# Patient Record
Sex: FEMALE | Race: WHITE | HISPANIC OR LATINO | Employment: UNEMPLOYED | URBAN - METROPOLITAN AREA
[De-identification: names, ages, dates, MRNs, and addresses within clinical notes are randomized per-mention and may not be internally consistent; named-entity substitution may affect disease eponyms.]

---

## 2017-09-11 ENCOUNTER — HOSPITAL ENCOUNTER (EMERGENCY)
Facility: MEDICAL CENTER | Age: 22
End: 2017-09-11
Attending: EMERGENCY MEDICINE
Payer: MEDICAID

## 2017-09-11 VITALS
WEIGHT: 117.06 LBS | BODY MASS INDEX: 23.6 KG/M2 | DIASTOLIC BLOOD PRESSURE: 62 MMHG | TEMPERATURE: 97.7 F | HEART RATE: 89 BPM | OXYGEN SATURATION: 92 % | HEIGHT: 59 IN | RESPIRATION RATE: 18 BRPM | SYSTOLIC BLOOD PRESSURE: 101 MMHG

## 2017-09-11 DIAGNOSIS — J02.9 PHARYNGITIS, UNSPECIFIED ETIOLOGY: ICD-10-CM

## 2017-09-11 LAB
DEPRECATED S PYO AG THROAT QL EIA: NORMAL
SIGNIFICANT IND 70042: NORMAL
SITE SITE: NORMAL
SOURCE SOURCE: NORMAL

## 2017-09-11 PROCEDURE — 87081 CULTURE SCREEN ONLY: CPT | Mod: EDC

## 2017-09-11 PROCEDURE — 700111 HCHG RX REV CODE 636 W/ 250 OVERRIDE (IP): Mod: EDC | Performed by: EMERGENCY MEDICINE

## 2017-09-11 PROCEDURE — 99284 EMERGENCY DEPT VISIT MOD MDM: CPT | Mod: EDC

## 2017-09-11 PROCEDURE — 87880 STREP A ASSAY W/OPTIC: CPT | Mod: EDC

## 2017-09-11 RX ORDER — DEXAMETHASONE SODIUM PHOSPHATE 4 MG/ML
10 INJECTION, SOLUTION INTRA-ARTICULAR; INTRALESIONAL; INTRAMUSCULAR; INTRAVENOUS; SOFT TISSUE ONCE
Status: COMPLETED | OUTPATIENT
Start: 2017-09-11 | End: 2017-09-11

## 2017-09-11 RX ADMIN — PENICILLIN G BENZATHINE 1.2 MILLION UNITS: 1200000 INJECTION, SUSPENSION INTRAMUSCULAR at 02:50

## 2017-09-11 RX ADMIN — DEXAMETHASONE SODIUM PHOSPHATE 10 MG: 4 INJECTION, SOLUTION INTRAMUSCULAR; INTRAVENOUS at 02:49

## 2017-09-11 ASSESSMENT — LIFESTYLE VARIABLES
CONSUMPTION TOTAL: INCOMPLETE
TOTAL SCORE: 0
EVER FELT BAD OR GUILTY ABOUT YOUR DRINKING: NO
HAVE YOU EVER FELT YOU SHOULD CUT DOWN ON YOUR DRINKING: NO
HAVE PEOPLE ANNOYED YOU BY CRITICIZING YOUR DRINKING: NO
TOTAL SCORE: 0
EVER HAD A DRINK FIRST THING IN THE MORNING TO STEADY YOUR NERVES TO GET RID OF A HANGOVER: NO
TOTAL SCORE: 0
DO YOU DRINK ALCOHOL: YES

## 2017-09-11 NOTE — ED NOTES
Discharge instructions given to patient, a verbal understanding of all instructions was stated.Pt preferred to walk out. VSS, all belongings accounted for.

## 2017-09-11 NOTE — ED NOTES
Triage notes     Pt woke up feeling sick today and it has gotten worse.  Pt c/o swollen tonsils and congestion.  Pts tonsils are very swollen but are pink no white pockets seen initially.  Pt states that she is having trouble swallowing and breathing but able to speak in full sentences without trouble.    .  Chief Complaint   Patient presents with   • Sore Throat     started this morning, tonsils are visually large in back of throat pt states that she is having trouble breathing and swallowing    • Congestion     started today

## 2017-09-11 NOTE — DISCHARGE INSTRUCTIONS
Pharyngitis  Pharyngitis is a sore throat (pharynx). There is redness, pain, and swelling of your throat.  HOME CARE   · Drink enough fluids to keep your pee (urine) clear or pale yellow.  · Only take medicine as told by your doctor.  ¨ You may get sick again if you do not take medicine as told. Finish your medicines, even if you start to feel better.  ¨ Do not take aspirin.  · Rest.  · Rinse your mouth (gargle) with salt water (½ tsp of salt per 1 qt of water) every 1-2 hours. This will help the pain.  · If you are not at risk for choking, you can suck on hard candy or sore throat lozenges.  GET HELP IF:  · You have large, tender lumps on your neck.  · You have a rash.  · You cough up green, yellow-brown, or bloody spit.  GET HELP RIGHT AWAY IF:   · You have a stiff neck.  · You drool or cannot swallow liquids.  · You throw up (vomit) or are not able to keep medicine or liquids down.  · You have very bad pain that does not go away with medicine.  · You have problems breathing (not from a stuffy nose).  MAKE SURE YOU:   · Understand these instructions.  · Will watch your condition.  · Will get help right away if you are not doing well or get worse.     This information is not intended to replace advice given to you by your health care provider. Make sure you discuss any questions you have with your health care provider.     Document Released: 06/05/2009 Document Revised: 10/08/2014 Document Reviewed: 08/25/2014  [employer] Interactive Patient Education ©2016 [employer]

## 2017-09-11 NOTE — ED PROVIDER NOTES
"ED Provider Note    CHIEF COMPLAINT  Chief Complaint   Patient presents with   • Sore Throat     started this morning, tonsils are visually large in back of throat pt states that she is having trouble breathing and swallowing    • Congestion     started today        HPI  Tiara Olmedo is a 21 y.o. female Here for evaluation of sore throat and congestion. The patient states that this is an ongoing issue for her, and that every season when the weather changes, she gets very sick. She denies any fever or vomiting at this time. She states that it hurts to swallow, but she is able to. She has no drooling. She denies any ill contacts. She complains of overall congestion and ear pain.    PAST MEDICAL HISTORY   pharyngitis    SOCIAL HISTORY  Social History     Social History Main Topics   • Smoking status: Never Smoker   • Smokeless tobacco: Never Used   • Alcohol use Yes      Comment: occ   • Drug use: No   • Sexual activity: Yes     Partners: Male      Comment: none       SURGICAL HISTORY  patient denies any surgical history    CURRENT MEDICATIONS  Home Medications     Reviewed by Marjan Storm R.N. (Registered Nurse) on 09/11/17 at 0146  Med List Status: Partial   Medication Last Dose Status   amoxicillin (AMOXIL) 500 MG Cap  Active                ALLERGIES  No Known Allergies    REVIEW OF SYSTEMS  See HPI for further details. Review of systems as above, otherwise all other systems are negative.     PHYSICAL EXAM  VITAL SIGNS: /62   Pulse 73   Temp 36.5 °C (97.7 °F) (Temporal)   Resp 18   Ht 1.499 m (4' 11\")   Wt 53.1 kg (117 lb 1 oz)   LMP 06/11/2014   SpO2 98%   BMI 23.64 kg/m²     Constitutional: No distress. Well nourished.  HENT: Head is atraumatic. Oropharynx is moist. Tonsils 2 Plus bilaterally.  Exudate noted.    Neck;  Supple, anterior cervical lymphadenopathy.    Eyes: Conjunctivae are normal. EOMI.   Respiratory: No respiratory distress. Equal chest expansion. "   Musculoskeletal: Normal range of motion. No edema.   Neurological: Alert. No focal deficits noted.    Skin: No rash. No Pallor.   Psych: Appropriate for clinical situation. Normal affect.    Results for orders placed or performed during the hospital encounter of 09/11/17   RAPID STREP, CULT IF INDICATED (CULTURE IF NEGATIVE)   Result Value Ref Range    Significant Indicator NEG     Source THRT     Site THROAT     Rapid Strep Screen       Negative for Group A streptococcus.  A negative result may be obtained if the specimen is  inadequate or antigen concentration is below the  sensitivity of the test. This negative test will be followed  up with a culture as requested.             PROCEDURES     MEDICAL RECORD  I have reviewed patient's medical record and pertinent results are listed above.    COURSE & MEDICAL DECISION MAKING  I have reviewed any medical record information, laboratory studies and radiographic results as noted above.    2:35 AM  At this time, the patient is tolerating secretions without difficulty, and has no drooling. She will be given steroids here, and a one-time dose of antibiotics IM.  She is nontoxic appearing, afebrile, and able to swallow. She has been given ENT to follow-up with, at her request because she would like to have her tonsils out. Her strep screen was done from the triage, and was negative. However because she has anterior cervical lymphadenopathy and large tonsils and a sedate, I will treat her.    Differential diagnoses include but not limited to: Strep pharyngitis, PTA,    This patient presents with pharyngitis.  At this time, I have counseled the patient/family regarding their medications, pain control, and follow up.  They will continue their medications, if any, as prescribed.  They will return immediately for any worsening symptoms and/or any other medical concerns.  They will see their doctor, or contact the doctor provided, in 1-2 days for follow up.       FINAL  IMPRESSION  1. Pharyngitis, unspecified etiology        Electronically signed by: Nelson Mendenhall, 9/11/2017 2:34 AM

## 2017-09-11 NOTE — ED NOTES
Pt. Ambulated to [place] 22 with steady gait. Pt. States sore throat and congestion along with tonsil swelling. Pt's right tonsil is noticeably swollen with white exudate present. Pt. Hooked up to monitoring equipment and updated on the POC for ERP to see. Call light within reach. Will continue to monitor.

## 2017-09-13 LAB
S PYO SPEC QL CULT: NORMAL
SIGNIFICANT IND 70042: NORMAL
SITE SITE: NORMAL
SOURCE SOURCE: NORMAL

## 2018-04-29 ENCOUNTER — HOSPITAL ENCOUNTER (EMERGENCY)
Facility: MEDICAL CENTER | Age: 23
End: 2018-04-29
Attending: EMERGENCY MEDICINE
Payer: MEDICAID

## 2018-04-29 VITALS
RESPIRATION RATE: 16 BRPM | HEART RATE: 95 BPM | HEIGHT: 57 IN | TEMPERATURE: 99.1 F | OXYGEN SATURATION: 96 % | DIASTOLIC BLOOD PRESSURE: 59 MMHG | BODY MASS INDEX: 26.3 KG/M2 | SYSTOLIC BLOOD PRESSURE: 111 MMHG | WEIGHT: 121.91 LBS

## 2018-04-29 DIAGNOSIS — J02.9 PHARYNGITIS, UNSPECIFIED ETIOLOGY: ICD-10-CM

## 2018-04-29 LAB
S PYO AG THROAT QL: NORMAL
SIGNIFICANT IND 70042: NORMAL
SITE SITE: NORMAL
SOURCE SOURCE: NORMAL

## 2018-04-29 PROCEDURE — A9270 NON-COVERED ITEM OR SERVICE: HCPCS | Performed by: EMERGENCY MEDICINE

## 2018-04-29 PROCEDURE — 99284 EMERGENCY DEPT VISIT MOD MDM: CPT

## 2018-04-29 PROCEDURE — 700102 HCHG RX REV CODE 250 W/ 637 OVERRIDE(OP): Performed by: EMERGENCY MEDICINE

## 2018-04-29 PROCEDURE — 87081 CULTURE SCREEN ONLY: CPT

## 2018-04-29 PROCEDURE — 87880 STREP A ASSAY W/OPTIC: CPT

## 2018-04-29 RX ORDER — DEXAMETHASONE 4 MG/1
8 TABLET ORAL ONCE
Status: COMPLETED | OUTPATIENT
Start: 2018-04-29 | End: 2018-04-29

## 2018-04-29 RX ADMIN — DEXAMETHASONE 8 MG: 4 TABLET ORAL at 10:22

## 2018-04-29 ASSESSMENT — PAIN SCALES - GENERAL: PAINLEVEL_OUTOF10: 8

## 2018-04-29 NOTE — ED PROVIDER NOTES
"ED Provider Note    CHIEF COMPLAINT  Chief Complaint   Patient presents with   • Sore Throat     feels like her tonsils are really swollen for the last 3 days now she feels like she has some SOB. No appetite.   • Fever   • Vomiting       HPI  Tiara Olmedo is a 22 y.o. female who presents with a history of previous strep pharyngitis, she describes 3 days of feeling like her tonsils are swollen, she's had minimal cough, minimal runny nose. She has felt febrile. She's had intermittent vomiting. She describes her symptoms as moderate to severe.    REVIEW OF SYSTEMS  See HPI for further details. All other systems are negative.     PAST MEDICAL HISTORY   strep pharyngitis    SOCIAL HISTORY  Social History     Social History Main Topics   • Smoking status: Never Smoker   • Smokeless tobacco: Never Used   • Alcohol use Yes      Comment: occ   • Drug use: No   • Sexual activity: Yes     Partners: Male      Comment: none       SURGICAL HISTORY  patient denies any surgical history    CURRENT MEDICATIONS  Home Medications     Reviewed by Jason Villatoro (Pharmacy Tech) on 04/29/18 at [employer]  Med List Status: Complete   Medication Last Dose Status        Patient Kyle Taking any Medications                       ALLERGIES  No Known Allergies    PHYSICAL EXAM  VITAL SIGNS: /53   Pulse (!) 106   Temp 37.3 °C (99.1 °F)   Resp 18   Ht 1.448 m (4' 9\")   Wt 55.3 kg (121 lb 14.6 oz)   LMP 06/11/2014   SpO2 96%   BMI 26.38 kg/m²  @SHELLY[[id number]::@   Pulse ox interpretation: I interpret this pulse ox as normal.  Constitutional: Alert in no apparent distress.  HENT: No signs of trauma, Bilateral external ears normal, Nose normal.   Throat: Enlarged tonsils with erythema, midline uvula, no exudate.  Eyes: Pupils are equal and reactive, Conjunctiva normal, Non-icteric.   Neck: Normal range of motion, No tenderness, Supple, No stridor.   Lymphatic: No lymphadenopathy noted.   Cardiovascular: Regular rate and " rhythm, no murmurs.   Thorax & Lungs: Normal breath sounds, No respiratory distress, No wheezing, No chest tenderness.   Abdomen: Bowel sounds normal, Soft, No tenderness, No masses, No pulsatile masses. No peritoneal signs.  Skin: Warm, Dry, No erythema, No rash.   Extremities: Intact distal pulses, No edema, No tenderness, No cyanosis.  Musculoskeletal: Good range of motion in all major joints. No tenderness to palpation or major deformities noted.   Neurologic: Alert , Normal motor function, Normal sensory function, No focal deficits noted.   Psychiatric: Affect normal, Judgment normal, Mood normal.       DIAGNOSTIC STUDIES / PROCEDURES      LABS  Labs Reviewed   RAPID STREP,CULT IF INDICATED   BETA STREP SCREEN (GP. A)     Rapid strep negative, strep culture pending          COURSE & MEDICAL DECISION MAKING  Pertinent Labs & Imaging studies reviewed. (See chart for details)    Differential diagnosis: Strep pharyngitis, viral pharyngitis    10:19 AM  The patient was given Decadron 8 mg by mouth. A rapid strep test was sent.    10:49 AM rapid strep negative, strep culture sent and pending.    The patient will be discharged, I have referred her to Dr. Villagran on call for ENT because the patient reports she has enlarged tonsils at baseline and any infection she gets makes her very uncomfortable with tonsillar hypertrophy. We will confirm her contact info should've that she can be called if the strep culture comes back positive for antibiotic treatment. The patient will return for new or worsening symptoms and is stable at the time of discharge.    The patient is referred to her primary physician for blood pressure management, diabetic screening, and for all other preventative health concerns.        DISPOSITION:  Patient will be discharged home in stable condition.    FOLLOW UP:  [facility], Emergency Dept  [address]  [place] [id number]  [phone]    If symptoms  worsen    Yung Lenz M.D.  [address]  [address]  [facility] [id number] [phone]      As needed    Amelia Villagran M.D.  [address]  [facility] [id number] [phone]      call for follow-up if you desire to discuss tonsillectomy for enlarged tonsils      OUTPATIENT MEDICATIONS:  New Prescriptions    No medications on file             The patient will return for worsening symptoms and is stable at the time of discharge. The patient verbalizes understanding and will comply.    FINAL IMPRESSION  1. Acute pharyngitis  2.   3.         Electronically signed by: Ti Blair, 4/29/2018 10:17 AM

## 2018-04-29 NOTE — DISCHARGE INSTRUCTIONS
Pharyngitis  Pharyngitis is redness, pain, and swelling (inflammation) of your pharynx.  What are the causes?  Pharyngitis is usually caused by infection. Most of the time, these infections are from viruses (viral) and are part of a cold. However, sometimes pharyngitis is caused by bacteria (bacterial). Pharyngitis can also be caused by allergies. Viral pharyngitis may be spread from person to person by coughing, sneezing, and personal items or utensils (cups, forks, spoons, toothbrushes). Bacterial pharyngitis may be spread from person to person by more intimate contact, such as kissing.  What are the signs or symptoms?  Symptoms of pharyngitis include:  · Sore throat.  · Tiredness (fatigue).  · Low-grade fever.  · Headache.  · Joint pain and muscle aches.  · Skin rashes.  · Swollen lymph nodes.  · Plaque-like film on throat or tonsils (often seen with bacterial pharyngitis).  How is this diagnosed?  Your health care provider will ask you questions about your illness and your symptoms. Your medical history, along with a physical exam, is often all that is needed to diagnose pharyngitis. Sometimes, a rapid strep test is done. Other lab tests may also be done, depending on the suspected cause.  How is this treated?  Viral pharyngitis will usually get better in 3-4 days without the use of medicine. Bacterial pharyngitis is treated with medicines that kill germs (antibiotics).  Follow these instructions at home:  · Drink enough water and fluids to keep your urine clear or pale yellow.  · Only take over-the-counter or prescription medicines as directed by your health care provider:  ¨ If you are prescribed antibiotics, make sure you finish them even if you start to feel better.  ¨ Do not take aspirin.  · Get lots of rest.  · Gargle with 8 oz of salt water (½ tsp of salt per 1 qt of water) as often as every 1-2 hours to soothe your throat.  · Throat lozenges (if you are not at risk for choking) or sprays may be used to  soothe your throat.  Contact a health care provider if:  · You have large, tender lumps in your neck.  · You have a rash.  · You cough up green, yellow-brown, or bloody spit.  Get help right away if:  · Your neck becomes stiff.  · You drool or are unable to swallow liquids.  · You vomit or are unable to keep medicines or liquids down.  · You have severe pain that does not go away with the use of recommended medicines.  · You have trouble breathing (not caused by a stuffy nose).  This information is not intended to replace advice given to you by your health care provider. Make sure you discuss any questions you have with your health care provider.  Document Released: 12/18/2006 Document Revised: 05/25/2017 Document Reviewed: 08/25/2014  [employer] Interactive Patient Education © 2017 [employer]

## 2018-05-01 LAB
S PYO SPEC QL CULT: NORMAL
SIGNIFICANT IND 70042: NORMAL
SITE SITE: NORMAL
SOURCE SOURCE: NORMAL

## 2018-09-29 NOTE — ED NOTES
Chief Complaint   Patient presents with   • Sore Throat     feels like her tonsils are really swollen for the last 3 days now she feels like she has some SOB. No appetite.   • Fever   • Vomiting     
Demographics/contact info verified.  Discharge and f/u instructions discussed and understanding verbalized.  Ambulatory out of ED.    
Med rec complete per Pt at bedside  Allergies reviewed  No ABX in last 30 days   
Rapid strep in process.  Medicated as ordered.  Given water.  Sig other at BS and call light in reach, aware to call for any needs/changes.   
Spoke with Dr. Villagran's office.  They received a faxed referral for patient however there was no ENT on call for [place].  Discussed that patient will need a referral from her PCP and then they will be happy to see her.  Called patient Tiara to discuss plan of care.  She will message PCP to see if they want her to come in for referral. Patient states her symptoms have improved and she is feeling better. Verbalizes understanding of POC and appreciated call.   
no radiation

## 2018-10-16 ENCOUNTER — HOSPITAL ENCOUNTER (EMERGENCY)
Facility: MEDICAL CENTER | Age: 23
End: 2018-10-16
Attending: EMERGENCY MEDICINE
Payer: MEDICAID

## 2018-10-16 VITALS
OXYGEN SATURATION: 95 % | DIASTOLIC BLOOD PRESSURE: 53 MMHG | WEIGHT: 123.24 LBS | RESPIRATION RATE: 16 BRPM | HEART RATE: 66 BPM | HEIGHT: 59 IN | BODY MASS INDEX: 24.84 KG/M2 | SYSTOLIC BLOOD PRESSURE: 102 MMHG | TEMPERATURE: 97.5 F

## 2018-10-16 DIAGNOSIS — L02.91 ABSCESS: ICD-10-CM

## 2018-10-16 DIAGNOSIS — R23.8 PIMPLES: ICD-10-CM

## 2018-10-16 DIAGNOSIS — R51.9 NONINTRACTABLE HEADACHE, UNSPECIFIED CHRONICITY PATTERN, UNSPECIFIED HEADACHE TYPE: ICD-10-CM

## 2018-10-16 PROCEDURE — 99283 EMERGENCY DEPT VISIT LOW MDM: CPT

## 2018-10-16 RX ORDER — CEPHALEXIN 500 MG/1
500 CAPSULE ORAL 4 TIMES DAILY
Qty: 20 CAP | Refills: 0 | Status: SHIPPED | OUTPATIENT
Start: 2018-10-16 | End: 2018-10-21

## 2018-10-16 ASSESSMENT — PAIN SCALES - GENERAL: PAINLEVEL_OUTOF10: 9

## 2018-10-17 NOTE — ED TRIAGE NOTES
Pt comes in c/o H/A and lump found on L siide of her head  Thought it was a pimple and attempted to pop it w/out success   On going for the last week

## 2018-10-17 NOTE — ED PROVIDER NOTES
"ED Provider Note    CHIEF COMPLAINT  Chief Complaint   Patient presents with   • Lump     on L side of head   • Head Ache     L side of head       HPI  Tiara Olmedo is a 22 y.o. female who presents tenderness swelling the left side of her scalp.  Patient noticed a lump also left side of her head for several days.  She states the swelling is much worse but somewhat better.  She is got some tenderness around the periauricular area on the left side of her head.  Nothing makes her better or worse.    REVIEW OF SYSTEMS  CONSTITUTIONAL:  Denies fever, chills,   ENT:  Denies sore throat, nose or ear pain  CARDIOVASCULAR:  Denies chest pain,   RESPIRATORY:  Denies cough or shortness of breath or difficulty breathing  GI:  Denies abdominal pain,  MUSCULOSKELETAL:  Denies weakness joint swelling or back pain  SKIN: Positive lump and swelling in the left temporal area.  Radiation the pain into her temporal area.  ALLERGIC: No itchy rashes.  NEUROLOGIC: She states that the skin along the left side of her head is tender near and posterior to where this lump is on the side of her head      PAST MEDICAL HISTORY  Denies being pregnant    FAMILY HISTORY  Family History   Problem Relation Age of Onset   • Diabetes Father        SOCIAL HISTORY   reports that she has never smoked. She has never used smokeless tobacco. She reports that she drinks alcohol. She reports that she does not use drugs.    SURGICAL HISTORY  History reviewed. No pertinent surgical history.    CURRENT MEDICATIONS  None    ALLERGIES  No Known Allergies    PHYSICAL EXAM  VITAL SIGNS: /60   Pulse 78   Temp 36.9 °C (98.4 °F)   Resp 16   Ht 1.499 m (4' 11\")   Wt 55.9 kg (123 lb 3.8 oz)   LMP 10/10/2018 (Exact Date)   SpO2 98%   BMI 24.89 kg/m²      Constitutional: Patient is awake alert person place and time. No acute respiratory distress Well developed, Well nourished, Non-toxic appearance.   HENT: Normocephalic, Atraumatic,  Bilateral " external ears normal, tympanic membranes normal oropharynx pink moist with no exudates, Nose patent.   She has a small half centimeters lump to the left temporal area.  It is not red is not appear to be an active abscess specifically.  More some induration like a pimple.  No signs of cellulitis.  Also some tenderness to the scalp on the left side.  I see no signs of vesicles.  Eyes:  Sclera and conjunctiva clear, No discharge.   Neck:  Supple no nuchal rigidity, no thyromegaly or mass. Non-tender  Lymphatic: No supraclavicular   Cardiovascular: Heart is regular rate and rhythm no murmur, rub or thrill.   Thorax & Lungs: Chest is symmetrical, with good breath sounds. No wheezing or crackles.   Skin: Small pimple left temporal area.  With tenderness to the skin in the adjacent area  Extremities: No  edema.  Non tender.   Musculoskeletal: Good range of motion upper lower extremities.    Neurologic: Alert & oriented to person, place, and time.  Symmetrical in the upper lower extremities    COURSE & MEDICAL DECISION MAKING  Pertinent Labs & Imaging studies reviewed. (See chart for details)  Patient appears to have a small pimple in the left temporal area.  She states she has been trying to squeeze or push it no pus is come out of it.  I suspect that this is giving her the periauricular lymph nodes and tenderness to the scalp.  We will try warm compresses and oral antibiotics for 5 days.  I explained to her how important it is for need to have close follow-up as an outpatient.    Stable for discharge          FINAL IMPRESSION  1.  Pustular small abscess left temporal area       PLAN  1.  Keflex for 5 days  2.  Abscess information she  3.  Up with her primary care doctor  within 5 days  4. Return to the emergency department for increased pains, fevers, vomiting or change in condition.  Electronically signed by: Elmo Benton, 10/16/2018 9:26 PM

## 2018-10-17 NOTE — ED NOTES
Pt given written and oral discharge instructions. Pt verbalized understanding of all instructions given. All questions answered. VSS. Pt given paper prescription and educated on use and side effects. Pt given f/u instructions and educated on s/s of when to return to the ER. Pt ambulating independently upon time of discharge in good condition.

## 2019-12-28 ENCOUNTER — HOSPITAL ENCOUNTER (EMERGENCY)
Facility: MEDICAL CENTER | Age: 24
End: 2019-12-29
Attending: EMERGENCY MEDICINE

## 2019-12-28 DIAGNOSIS — O20.0 ABORTION, THREATENED: ICD-10-CM

## 2019-12-28 PROCEDURE — 99284 EMERGENCY DEPT VISIT MOD MDM: CPT

## 2019-12-28 PROCEDURE — 36415 COLL VENOUS BLD VENIPUNCTURE: CPT

## 2019-12-28 ASSESSMENT — PAIN DESCRIPTION - DESCRIPTORS: DESCRIPTORS: SHARP

## 2019-12-29 ENCOUNTER — APPOINTMENT (OUTPATIENT)
Dept: RADIOLOGY | Facility: MEDICAL CENTER | Age: 24
End: 2019-12-29
Attending: EMERGENCY MEDICINE

## 2019-12-29 VITALS
TEMPERATURE: 97.8 F | HEIGHT: 57 IN | HEART RATE: 77 BPM | DIASTOLIC BLOOD PRESSURE: 70 MMHG | WEIGHT: 132.28 LBS | SYSTOLIC BLOOD PRESSURE: 103 MMHG | RESPIRATION RATE: 16 BRPM | BODY MASS INDEX: 28.54 KG/M2 | OXYGEN SATURATION: 97 %

## 2019-12-29 LAB
ANION GAP SERPL CALC-SCNC: 8 MMOL/L (ref 0–11.9)
APTT PPP: 32.2 SEC (ref 24.7–36)
B-HCG SERPL-ACNC: 69.7 MIU/ML (ref 0–5)
BASOPHILS # BLD AUTO: 0.7 % (ref 0–1.8)
BASOPHILS # BLD: 0.08 K/UL (ref 0–0.12)
BUN SERPL-MCNC: 10 MG/DL (ref 8–22)
CALCIUM SERPL-MCNC: 8.7 MG/DL (ref 8.5–10.5)
CHLORIDE SERPL-SCNC: 102 MMOL/L (ref 96–112)
CO2 SERPL-SCNC: 28 MMOL/L (ref 20–33)
CREAT SERPL-MCNC: 0.75 MG/DL (ref 0.5–1.4)
EOSINOPHIL # BLD AUTO: 0.21 K/UL (ref 0–0.51)
EOSINOPHIL NFR BLD: 1.8 % (ref 0–6.9)
ERYTHROCYTE [DISTWIDTH] IN BLOOD BY AUTOMATED COUNT: 42.9 FL (ref 35.9–50)
GLUCOSE SERPL-MCNC: 109 MG/DL (ref 65–99)
HCT VFR BLD AUTO: 39.9 % (ref 37–47)
HGB BLD-MCNC: 13.7 G/DL (ref 12–16)
IMM GRANULOCYTES # BLD AUTO: 0.03 K/UL (ref 0–0.11)
IMM GRANULOCYTES NFR BLD AUTO: 0.3 % (ref 0–0.9)
INR PPP: 1.07 (ref 0.87–1.13)
LYMPHOCYTES # BLD AUTO: 4.47 K/UL (ref 1–4.8)
LYMPHOCYTES NFR BLD: 38.1 % (ref 22–41)
MCH RBC QN AUTO: 31.1 PG (ref 27–33)
MCHC RBC AUTO-ENTMCNC: 34.3 G/DL (ref 33.6–35)
MCV RBC AUTO: 90.7 FL (ref 81.4–97.8)
MONOCYTES # BLD AUTO: 0.97 K/UL (ref 0–0.85)
MONOCYTES NFR BLD AUTO: 8.3 % (ref 0–13.4)
NEUTROPHILS # BLD AUTO: 5.97 K/UL (ref 2–7.15)
NEUTROPHILS NFR BLD: 50.8 % (ref 44–72)
NRBC # BLD AUTO: 0 K/UL
NRBC BLD-RTO: 0 /100 WBC
NUMBER OF RH DOSES IND 8505RD: NORMAL
PLATELET # BLD AUTO: 389 K/UL (ref 164–446)
PMV BLD AUTO: 9.4 FL (ref 9–12.9)
POTASSIUM SERPL-SCNC: 3.3 MMOL/L (ref 3.6–5.5)
PROTHROMBIN TIME: 14.2 SEC (ref 12–14.6)
RBC # BLD AUTO: 4.4 M/UL (ref 4.2–5.4)
RH BLD: NORMAL
SODIUM SERPL-SCNC: 138 MMOL/L (ref 135–145)
WBC # BLD AUTO: 11.7 K/UL (ref 4.8–10.8)

## 2019-12-29 PROCEDURE — 85730 THROMBOPLASTIN TIME PARTIAL: CPT

## 2019-12-29 PROCEDURE — 85025 COMPLETE CBC W/AUTO DIFF WBC: CPT

## 2019-12-29 PROCEDURE — 76801 OB US < 14 WKS SINGLE FETUS: CPT

## 2019-12-29 PROCEDURE — 80048 BASIC METABOLIC PNL TOTAL CA: CPT

## 2019-12-29 PROCEDURE — 85610 PROTHROMBIN TIME: CPT

## 2019-12-29 PROCEDURE — 84702 CHORIONIC GONADOTROPIN TEST: CPT

## 2019-12-29 PROCEDURE — 86901 BLOOD TYPING SEROLOGIC RH(D): CPT

## 2019-12-29 NOTE — ED TRIAGE NOTES
"Tiara Marlene Stanford Olmedo  24 y.o. female  Chief Complaint   Patient presents with   • Abdominal Pain     \"I have been bleeding a lot and having a lot of clots and gunk, I think I could be having a miscarriage.\" . Pt denies pre-nellie care, and has not had an US to confirm intrauterine pregnancy. Pt unsure of ALEJANDRA but believes she is less than 20w. Estimates LMP was September but states \"I have irregular periods.\" Pt reports a pad an hour.   • Pregnancy     Pt states \"with my last two pregnancies I didn't know I was pregnant until like 5 months because I never had any symptoms...\"    Pt is alert and oriented, speaking in full sentences, follows commands and responds appropriately to questions.   "

## 2019-12-29 NOTE — ED PROVIDER NOTES
"ED Provider Note    ED Provider Note      Primary care provider: Yung Lenz M.D.    CHIEF COMPLAINT  Chief Complaint   Patient presents with   • Abdominal Pain     \"I have been bleeding a lot and having a lot of clots and gunk, I think I could be having a miscarriage.\" . Pt denies pre-nellie care, and has not had an US to confirm intrauterine pregnancy. Pt unsure of ALEJANDRA but believes she is less than 20w. Estimates LMP was September but states \"I have irregular periods.\" Pt reports a pad an hour.   • Pregnancy       HPI  Tiara Olmedo is a 24 G3, P1y.o. unknown gestational age emale who presents to the Emergency Department with chief complaint of vaginal bleeding and minimal abdominal cramping.  Patient states extremely irregular pregnant menstrual cycles so does not know how far along she has states last menstrual cycle was at the end of September.  Over the last several hours she has had some moderate lower abdominal cramping and vaginal bleeding/passage of vaginal clots.  No urinary issues no stooling issues no other abnormal vaginal discharge no headache altered mental status cough congestion chest pain or shortness of breath no history of previous miscarriage or any known clotting disorders    REVIEW OF SYSTEMS  10 systems reviewed and otherwise negative, pertinent positives and negatives listed in the history of present illness.    PAST MEDICAL HISTORY   Patient denies    SURGICAL HISTORY  patient denies any surgical history    SOCIAL HISTORY  Social History     Tobacco Use   • Smoking status: Never Smoker   • Smokeless tobacco: Never Used   Substance Use Topics   • Alcohol use: Yes     Comment: occ   • Drug use: No      Social History     Substance and Sexual Activity   Drug Use No       FAMILY HISTORY  Non-Contributory    CURRENT MEDICATIONS  Home Medications    **Home medications have not yet been reviewed for this encounter**         ALLERGIES  No Known Allergies    PHYSICAL EXAM  VITAL " "SIGNS: /70   Pulse 77   Temp 36.6 °C (97.8 °F) (Temporal)   Resp 16   Ht 1.448 m (4' 9\")   Wt 60 kg (132 lb 4.4 oz)   SpO2 97%   BMI 28.62 kg/m²   Pulse ox interpretation: I interpret this pulse ox as normal.  Constitutional: Alert and oriented x 3, no acute distress  HEENT: Atraumatic normocephalic, pupils are equal round reactive to light extraocular movements are intact. The nares is clear, external ears are normal, mouth shows moist mucous membranes  Neck: Supple, no JVD no tracheal deviation  Cardiovascular: Regular rate and rhythm no murmur rub or gallop 2+ pulses peripherally x4  Thorax & Lungs: No respiratory distress, no wheezes rales or rhonchi, No chest tenderness.   GI: Soft nontender nondistended positive bowel sounds, no peritoneal signs  Skin: Warm dry no acute rash or lesion  Musculoskeletal: Moving all extremities with full range and 5 of 5 strength, no acute  deformity  Neurologic: Cranial nerves III through XII are grossly intact, no sensory deficit, no cerebellar dysfunction   Psychiatric: Appropriate affect for situation at this time      DIAGNOSTIC STUDIES / PROCEDURES  LABS      Results for orders placed or performed during the hospital encounter of 12/28/19   CBC WITH DIFFERENTIAL   Result Value Ref Range    WBC 11.7 (H) 4.8 - 10.8 K/uL    RBC 4.40 4.20 - 5.40 M/uL    Hemoglobin 13.7 12.0 - 16.0 g/dL    Hematocrit 39.9 37.0 - 47.0 %    MCV 90.7 81.4 - 97.8 fL    MCH 31.1 27.0 - 33.0 pg    MCHC 34.3 33.6 - 35.0 g/dL    RDW 42.9 35.9 - 50.0 fL    Platelet Count 389 164 - 446 K/uL    MPV 9.4 9.0 - 12.9 fL    Neutrophils-Polys 50.80 44.00 - 72.00 %    Lymphocytes 38.10 22.00 - 41.00 %    Monocytes 8.30 0.00 - 13.40 %    Eosinophils 1.80 0.00 - 6.90 %    Basophils 0.70 0.00 - 1.80 %    Immature Granulocytes 0.30 0.00 - 0.90 %    Nucleated RBC 0.00 /100 WBC    Neutrophils (Absolute) 5.97 2.00 - 7.15 K/uL    Lymphs (Absolute) 4.47 1.00 - 4.80 K/uL    Monos (Absolute) 0.97 (H) 0.00 - " 0.85 K/uL    Eos (Absolute) 0.21 0.00 - 0.51 K/uL    Baso (Absolute) 0.08 0.00 - 0.12 K/uL    Immature Granulocytes (abs) 0.03 0.00 - 0.11 K/uL    NRBC (Absolute) 0.00 K/uL   BASIC METABOLIC PANEL   Result Value Ref Range    Sodium 138 135 - 145 mmol/L    Potassium 3.3 (L) 3.6 - 5.5 mmol/L    Chloride 102 96 - 112 mmol/L    Co2 28 20 - 33 mmol/L    Glucose 109 (H) 65 - 99 mg/dL    Bun 10 8 - 22 mg/dL    Creatinine 0.75 0.50 - 1.40 mg/dL    Calcium 8.7 8.5 - 10.5 mg/dL    Anion Gap 8.0 0.0 - 11.9   APTT   Result Value Ref Range    APTT 32.2 24.7 - 36.0 sec   PROTHROMBIN TIME (INR)   Result Value Ref Range    PT 14.2 12.0 - 14.6 sec    INR 1.07 0.87 - 1.13   RH TYPE FOR RHOGAM FROM E.D.   Result Value Ref Range    Emergency Department Rh Typing POS     Number Of Rh Doses Indicated ZERO    HCG QUANTITATIVE SERUM   Result Value Ref Range    Bhcg 69.7 (H) 0.0 - 5.0 mIU/mL   ESTIMATED GFR   Result Value Ref Range    GFR If African American >60 >60 mL/min/1.73 m 2    GFR If Non African American >60 >60 mL/min/1.73 m 2       All labs reviewed by me.      RADIOLOGY  US-OB 1ST TRIMESTER WITH TRANSVAGINAL (COMBO)   Final Result         An intrauterine gestational sac is not expected to be visualized with a beta-hCG of this level. Differential considerations include early pregnancy or spontaneous . Ectopic pregnancy cannot be excluded.      Continued follow-up imaging and serial beta-hCG is recommended.        The radiologist's interpretation of all radiological studies have been reviewed by me.    COURSE & MEDICAL DECISION MAKING  Pertinent Labs & Imaging studies reviewed. (See chart for details)    7:46 AM - Patient seen and examined at bedside.         Patient noted to have slightly elevated blood pressure likely circumstantial secondary to presenting complaint. Referred to primary care physician for further evaluation.        Medical Decision Making: Ultrasound results as above beta-hCG very slightly elevated.   "Likely spontaneous miscarriage at this time however cannot rule out very early pregnancy versus ectopic.  Patient instructed to follow-up with OB and 2 days for repeat ultrasound and repeat hCG to return here if not able to be seen by OB return sooner for heavier bleeding palpitations lightheadedness syncopal presyncopal symptoms worsening pain any other acute symptoms or concerns otherwise discharged stable and improved condition.    /70   Pulse 77   Temp 36.6 °C (97.8 °F) (Temporal)   Resp 16   Ht 1.448 m (4' 9\")   Wt 60 kg (132 lb 4.4 oz)   SpO2 97%   BMI 28.62 kg/m²     [facility]  [address]  [place] [address] [phone]  In 2 days  for repeat ultrasound and beta hcg    [facility], Emergency Dept  [address] [address] [phone]    If symptoms worsen      There are no discharge medications for this patient.      FINAL IMPRESSION  1. , threatened Active          This dictation has been created using voice recognition software and/or scribes. The accuracy of the dictation is limited by the abilities of the software and the expertise of the scribes. I expect there may be some errors of grammar and possibly content. I made every attempt to manually correct the errors within my dictation. However, errors related to voice recognition software and/or scribes may still exist and should be interpreted within the appropriate context.            "

## 2019-12-29 NOTE — ED NOTES
Discharge instructions provided, pt verbalizes understanding. Pt is awake, alert, VSS. Pt ambulatory with steady gait out of ER with spouse.

## 2019-12-29 NOTE — ED NOTES
Pt ambulatory to BR with steady gait. Small amount of BRB noted on pad. Pt denies feeling dizzy or lightheaded. Urine sample obtained, sent to lab. Updated on POC. Awaiting US. Call light in reach.

## 2019-12-29 NOTE — ED NOTES
Pt resting quietly, talking to spouse at BS, NAD. Pt comfort level checked, denies needs. Updated on POC.

## 2022-12-29 ENCOUNTER — HOSPITAL ENCOUNTER (EMERGENCY)
Facility: MEDICAL CENTER | Age: 27
End: 2022-12-29

## 2022-12-29 VITALS
HEART RATE: 71 BPM | TEMPERATURE: 97 F | BODY MASS INDEX: 24.19 KG/M2 | OXYGEN SATURATION: 100 % | WEIGHT: 120 LBS | RESPIRATION RATE: 20 BRPM | DIASTOLIC BLOOD PRESSURE: 85 MMHG | SYSTOLIC BLOOD PRESSURE: 149 MMHG | HEIGHT: 59 IN

## 2022-12-29 LAB
ALBUMIN SERPL BCP-MCNC: 4.9 G/DL (ref 3.2–4.9)
ALBUMIN/GLOB SERPL: 1.6 G/DL
ALP SERPL-CCNC: 101 U/L (ref 30–99)
ALT SERPL-CCNC: 16 U/L (ref 2–50)
ANION GAP SERPL CALC-SCNC: 14 MMOL/L (ref 7–16)
AST SERPL-CCNC: 13 U/L (ref 12–45)
BASOPHILS # BLD AUTO: 0.4 % (ref 0–1.8)
BASOPHILS # BLD: 0.08 K/UL (ref 0–0.12)
BILIRUB SERPL-MCNC: 0.2 MG/DL (ref 0.1–1.5)
BUN SERPL-MCNC: 5 MG/DL (ref 8–22)
CALCIUM ALBUM COR SERPL-MCNC: 8.1 MG/DL (ref 8.5–10.5)
CALCIUM SERPL-MCNC: 8.8 MG/DL (ref 8.5–10.5)
CHLORIDE SERPL-SCNC: 108 MMOL/L (ref 96–112)
CO2 SERPL-SCNC: 21 MMOL/L (ref 20–33)
CREAT SERPL-MCNC: 0.58 MG/DL (ref 0.5–1.4)
EOSINOPHIL # BLD AUTO: 0.01 K/UL (ref 0–0.51)
EOSINOPHIL NFR BLD: 0.1 % (ref 0–6.9)
ERYTHROCYTE [DISTWIDTH] IN BLOOD BY AUTOMATED COUNT: 43.8 FL (ref 35.9–50)
GFR SERPLBLD CREATININE-BSD FMLA CKD-EPI: 125 ML/MIN/1.73 M 2
GLOBULIN SER CALC-MCNC: 3 G/DL (ref 1.9–3.5)
GLUCOSE SERPL-MCNC: 159 MG/DL (ref 65–99)
HCG SERPL QL: NEGATIVE
HCT VFR BLD AUTO: 42.8 % (ref 37–47)
HGB BLD-MCNC: 14.6 G/DL (ref 12–16)
IMM GRANULOCYTES # BLD AUTO: 0.13 K/UL (ref 0–0.11)
IMM GRANULOCYTES NFR BLD AUTO: 0.7 % (ref 0–0.9)
LIPASE SERPL-CCNC: 8 U/L (ref 11–82)
LYMPHOCYTES # BLD AUTO: 1.9 K/UL (ref 1–4.8)
LYMPHOCYTES NFR BLD: 10.3 % (ref 22–41)
MCH RBC QN AUTO: 30.5 PG (ref 27–33)
MCHC RBC AUTO-ENTMCNC: 34.1 G/DL (ref 33.6–35)
MCV RBC AUTO: 89.5 FL (ref 81.4–97.8)
MONOCYTES # BLD AUTO: 0.88 K/UL (ref 0–0.85)
MONOCYTES NFR BLD AUTO: 4.8 % (ref 0–13.4)
NEUTROPHILS # BLD AUTO: 15.5 K/UL (ref 2–7.15)
NEUTROPHILS NFR BLD: 83.7 % (ref 44–72)
NRBC # BLD AUTO: 0 K/UL
NRBC BLD-RTO: 0 /100 WBC
PLATELET # BLD AUTO: 440 K/UL (ref 164–446)
PMV BLD AUTO: 9 FL (ref 9–12.9)
POTASSIUM SERPL-SCNC: 3.5 MMOL/L (ref 3.6–5.5)
PROT SERPL-MCNC: 7.9 G/DL (ref 6–8.2)
RBC # BLD AUTO: 4.78 M/UL (ref 4.2–5.4)
SODIUM SERPL-SCNC: 143 MMOL/L (ref 135–145)
WBC # BLD AUTO: 18.5 K/UL (ref 4.8–10.8)

## 2022-12-29 PROCEDURE — 83690 ASSAY OF LIPASE: CPT

## 2022-12-29 PROCEDURE — 302449 STATCHG TRIAGE ONLY (STATISTIC)

## 2022-12-29 PROCEDURE — 85025 COMPLETE CBC W/AUTO DIFF WBC: CPT

## 2022-12-29 PROCEDURE — 84703 CHORIONIC GONADOTROPIN ASSAY: CPT

## 2022-12-29 PROCEDURE — 80053 COMPREHEN METABOLIC PANEL: CPT

## 2022-12-29 ASSESSMENT — PAIN DESCRIPTION - DESCRIPTORS: DESCRIPTORS: ACHING

## 2022-12-29 NOTE — ED TRIAGE NOTES
Patient comes in with abd pain that started 1 hr ago to the right side mainly, complaints of nausea, she does states she drank a little alcohol last night, has not taken anything for her pain, denies pregnancy, pain noted with urination, no noted fevers, appears uncomfortable, vitals stable.

## 2022-12-29 NOTE — ED NOTES
Pt again called in lobby and checked restroom. No answer. Attempted to call pt on phone number listed in chart and the number is no longer in service

## 2023-01-01 ENCOUNTER — HOSPITAL ENCOUNTER (INPATIENT)
Facility: MEDICAL CENTER | Age: 28
LOS: 1 days | DRG: 660 | End: 2023-01-02
Attending: HOSPITALIST | Admitting: STUDENT IN AN ORGANIZED HEALTH CARE EDUCATION/TRAINING PROGRAM

## 2023-01-01 PROBLEM — N20.1 URETERAL STONE: Status: ACTIVE | Noted: 2023-01-01

## 2023-01-01 PROCEDURE — 770006 HCHG ROOM/CARE - MED/SURG/GYN SEMI*

## 2023-01-01 RX ORDER — ACETAMINOPHEN 325 MG/1
650 TABLET ORAL EVERY 6 HOURS PRN
Status: DISCONTINUED | OUTPATIENT
Start: 2023-01-01 | End: 2023-01-02

## 2023-01-01 RX ORDER — ONDANSETRON 2 MG/ML
4 INJECTION INTRAMUSCULAR; INTRAVENOUS EVERY 4 HOURS PRN
Status: DISCONTINUED | OUTPATIENT
Start: 2023-01-01 | End: 2023-01-02

## 2023-01-01 ASSESSMENT — FIBROSIS 4 INDEX
FIB4 SCORE: 0.21
FIB4 SCORE: 0.21

## 2023-01-01 ASSESSMENT — LIFESTYLE VARIABLES
HOW MANY TIMES IN THE PAST YEAR HAVE YOU HAD 5 OR MORE DRINKS IN A DAY: 0
DOES PATIENT WANT TO STOP DRINKING: NO
EVER HAD A DRINK FIRST THING IN THE MORNING TO STEADY YOUR NERVES TO GET RID OF A HANGOVER: NO
AVERAGE NUMBER OF DAYS PER WEEK YOU HAVE A DRINK CONTAINING ALCOHOL: 0
CONSUMPTION TOTAL: NEGATIVE
TOTAL SCORE: 0
TOTAL SCORE: 0
ON A TYPICAL DAY WHEN YOU DRINK ALCOHOL HOW MANY DRINKS DO YOU HAVE: 2
TOTAL SCORE: 0
ALCOHOL_USE: YES
EVER FELT BAD OR GUILTY ABOUT YOUR DRINKING: NO
HAVE PEOPLE ANNOYED YOU BY CRITICIZING YOUR DRINKING: NO
HAVE YOU EVER FELT YOU SHOULD CUT DOWN ON YOUR DRINKING: NO

## 2023-01-01 ASSESSMENT — COGNITIVE AND FUNCTIONAL STATUS - GENERAL
SUGGESTED CMS G CODE MODIFIER DAILY ACTIVITY: CH
MOBILITY SCORE: 24
SUGGESTED CMS G CODE MODIFIER MOBILITY: CH
DAILY ACTIVITIY SCORE: 24

## 2023-01-01 ASSESSMENT — PATIENT HEALTH QUESTIONNAIRE - PHQ9
1. LITTLE INTEREST OR PLEASURE IN DOING THINGS: NOT AT ALL
SUM OF ALL RESPONSES TO PHQ9 QUESTIONS 1 AND 2: 0
2. FEELING DOWN, DEPRESSED, IRRITABLE, OR HOPELESS: NOT AT ALL

## 2023-01-01 ASSESSMENT — PAIN DESCRIPTION - PAIN TYPE
TYPE: ACUTE PAIN
TYPE: ACUTE PAIN

## 2023-01-01 NOTE — PROGRESS NOTES
Transfer from [facility] from Dr Joycelyn Richey  Reason: Obstructing stone and Urology equipment not working or unavailable and urology request transfer to [facility] for surgery    Per Transferring physician, vitals stable.    Dr Rob, urologist, aware of patient per Dr Richey and will need to be alerted once here.

## 2023-01-02 ENCOUNTER — APPOINTMENT (OUTPATIENT)
Dept: RADIOLOGY | Facility: MEDICAL CENTER | Age: 28
DRG: 660 | End: 2023-01-02
Attending: UROLOGY

## 2023-01-02 ENCOUNTER — ANESTHESIA EVENT (OUTPATIENT)
Dept: SURGERY | Facility: MEDICAL CENTER | Age: 28
DRG: 660 | End: 2023-01-02

## 2023-01-02 ENCOUNTER — ANESTHESIA (OUTPATIENT)
Dept: SURGERY | Facility: MEDICAL CENTER | Age: 28
DRG: 660 | End: 2023-01-02

## 2023-01-02 VITALS
OXYGEN SATURATION: 94 % | TEMPERATURE: 97.2 F | HEIGHT: 59 IN | RESPIRATION RATE: 16 BRPM | SYSTOLIC BLOOD PRESSURE: 119 MMHG | WEIGHT: 125 LBS | BODY MASS INDEX: 25.2 KG/M2 | HEART RATE: 70 BPM | DIASTOLIC BLOOD PRESSURE: 75 MMHG

## 2023-01-02 PROBLEM — R11.2 NAUSEA & VOMITING: Status: ACTIVE | Noted: 2023-01-02

## 2023-01-02 PROBLEM — Z96.0 STATUS POST PLACEMENT OF URETERAL STENT: Status: ACTIVE | Noted: 2023-01-02

## 2023-01-02 PROBLEM — N20.1 URETERAL STONE: Status: RESOLVED | Noted: 2023-01-01 | Resolved: 2023-01-02

## 2023-01-02 PROBLEM — R11.2 NAUSEA & VOMITING: Status: RESOLVED | Noted: 2023-01-02 | Resolved: 2023-01-02

## 2023-01-02 LAB
ALBUMIN SERPL BCP-MCNC: 4.5 G/DL (ref 3.2–4.9)
ALBUMIN/GLOB SERPL: 1.4 G/DL
ALP SERPL-CCNC: 97 U/L (ref 30–99)
ALT SERPL-CCNC: 9 U/L (ref 2–50)
ANION GAP SERPL CALC-SCNC: 13 MMOL/L (ref 7–16)
APPEARANCE UR: ABNORMAL
APTT PPP: 33.5 SEC (ref 24.7–36)
AST SERPL-CCNC: 12 U/L (ref 12–45)
BACTERIA #/AREA URNS HPF: NEGATIVE /HPF
BASOPHILS # BLD AUTO: 0.5 % (ref 0–1.8)
BASOPHILS # BLD: 0.07 K/UL (ref 0–0.12)
BILIRUB SERPL-MCNC: 0.7 MG/DL (ref 0.1–1.5)
BILIRUB UR QL STRIP.AUTO: NEGATIVE
BUN SERPL-MCNC: 8 MG/DL (ref 8–22)
CALCIUM ALBUM COR SERPL-MCNC: 8.9 MG/DL (ref 8.5–10.5)
CALCIUM SERPL-MCNC: 9.3 MG/DL (ref 8.5–10.5)
CHLORIDE SERPL-SCNC: 96 MMOL/L (ref 96–112)
CO2 SERPL-SCNC: 25 MMOL/L (ref 20–33)
COLOR UR: YELLOW
CREAT SERPL-MCNC: 0.96 MG/DL (ref 0.5–1.4)
EOSINOPHIL # BLD AUTO: 0.19 K/UL (ref 0–0.51)
EOSINOPHIL NFR BLD: 1.3 % (ref 0–6.9)
EPI CELLS #/AREA URNS HPF: ABNORMAL /HPF
ERYTHROCYTE [DISTWIDTH] IN BLOOD BY AUTOMATED COUNT: 41.1 FL (ref 35.9–50)
GFR SERPLBLD CREATININE-BSD FMLA CKD-EPI: 82 ML/MIN/1.73 M 2
GLOBULIN SER CALC-MCNC: 3.2 G/DL (ref 1.9–3.5)
GLUCOSE SERPL-MCNC: 102 MG/DL (ref 65–99)
GLUCOSE UR STRIP.AUTO-MCNC: NEGATIVE MG/DL
HCG SERPL QL: NEGATIVE
HCT VFR BLD AUTO: 39.7 % (ref 37–47)
HGB BLD-MCNC: 13.6 G/DL (ref 12–16)
HYALINE CASTS #/AREA URNS LPF: ABNORMAL /LPF
IMM GRANULOCYTES # BLD AUTO: 0.08 K/UL (ref 0–0.11)
IMM GRANULOCYTES NFR BLD AUTO: 0.5 % (ref 0–0.9)
INR PPP: 1.12 (ref 0.87–1.13)
KETONES UR STRIP.AUTO-MCNC: 80 MG/DL
LACTATE SERPL-SCNC: 1.1 MMOL/L (ref 0.5–2)
LEUKOCYTE ESTERASE UR QL STRIP.AUTO: NEGATIVE
LYMPHOCYTES # BLD AUTO: 2.1 K/UL (ref 1–4.8)
LYMPHOCYTES NFR BLD: 14.1 % (ref 22–41)
MAGNESIUM SERPL-MCNC: 1.8 MG/DL (ref 1.5–2.5)
MCH RBC QN AUTO: 30.8 PG (ref 27–33)
MCHC RBC AUTO-ENTMCNC: 34.3 G/DL (ref 33.6–35)
MCV RBC AUTO: 89.8 FL (ref 81.4–97.8)
MICRO URNS: ABNORMAL
MONOCYTES # BLD AUTO: 1.04 K/UL (ref 0–0.85)
MONOCYTES NFR BLD AUTO: 7 % (ref 0–13.4)
NEUTROPHILS # BLD AUTO: 11.45 K/UL (ref 2–7.15)
NEUTROPHILS NFR BLD: 76.6 % (ref 44–72)
NITRITE UR QL STRIP.AUTO: NEGATIVE
NRBC # BLD AUTO: 0 K/UL
NRBC BLD-RTO: 0 /100 WBC
PATHOLOGY CONSULT NOTE: NORMAL
PH UR STRIP.AUTO: 7 [PH] (ref 5–8)
PLATELET # BLD AUTO: 379 K/UL (ref 164–446)
PMV BLD AUTO: 9.3 FL (ref 9–12.9)
POTASSIUM SERPL-SCNC: 4 MMOL/L (ref 3.6–5.5)
PROT SERPL-MCNC: 7.7 G/DL (ref 6–8.2)
PROT UR QL STRIP: NEGATIVE MG/DL
PROTHROMBIN TIME: 14.2 SEC (ref 12–14.6)
RBC # BLD AUTO: 4.42 M/UL (ref 4.2–5.4)
RBC # URNS HPF: ABNORMAL /HPF
RBC UR QL AUTO: ABNORMAL
SODIUM SERPL-SCNC: 134 MMOL/L (ref 135–145)
SP GR UR STRIP.AUTO: 1.02
UROBILINOGEN UR STRIP.AUTO-MCNC: 1 MG/DL
WBC # BLD AUTO: 14.9 K/UL (ref 4.8–10.8)
WBC #/AREA URNS HPF: ABNORMAL /HPF

## 2023-01-02 PROCEDURE — 0T768DZ DILATION OF RIGHT URETER WITH INTRALUMINAL DEVICE, VIA NATURAL OR ARTIFICIAL OPENING ENDOSCOPIC: ICD-10-PCS | Performed by: UROLOGY

## 2023-01-02 PROCEDURE — 700111 HCHG RX REV CODE 636 W/ 250 OVERRIDE (IP): Performed by: EMERGENCY MEDICINE

## 2023-01-02 PROCEDURE — 700117 HCHG RX CONTRAST REV CODE 255: Performed by: UROLOGY

## 2023-01-02 PROCEDURE — 84703 CHORIONIC GONADOTROPIN ASSAY: CPT

## 2023-01-02 PROCEDURE — 80053 COMPREHEN METABOLIC PANEL: CPT

## 2023-01-02 PROCEDURE — 700111 HCHG RX REV CODE 636 W/ 250 OVERRIDE (IP)

## 2023-01-02 PROCEDURE — 85730 THROMBOPLASTIN TIME PARTIAL: CPT

## 2023-01-02 PROCEDURE — 87040 BLOOD CULTURE FOR BACTERIA: CPT | Mod: 91

## 2023-01-02 PROCEDURE — 700105 HCHG RX REV CODE 258: Performed by: EMERGENCY MEDICINE

## 2023-01-02 PROCEDURE — 160002 HCHG RECOVERY MINUTES (STAT): Performed by: UROLOGY

## 2023-01-02 PROCEDURE — 81001 URINALYSIS AUTO W/SCOPE: CPT

## 2023-01-02 PROCEDURE — 85610 PROTHROMBIN TIME: CPT

## 2023-01-02 PROCEDURE — 160048 HCHG OR STATISTICAL LEVEL 1-5: Performed by: UROLOGY

## 2023-01-02 PROCEDURE — 700105 HCHG RX REV CODE 258

## 2023-01-02 PROCEDURE — C1769 GUIDE WIRE: HCPCS | Performed by: UROLOGY

## 2023-01-02 PROCEDURE — 83605 ASSAY OF LACTIC ACID: CPT

## 2023-01-02 PROCEDURE — 160009 HCHG ANES TIME/MIN: Performed by: UROLOGY

## 2023-01-02 PROCEDURE — 700102 HCHG RX REV CODE 250 W/ 637 OVERRIDE(OP)

## 2023-01-02 PROCEDURE — 85025 COMPLETE CBC W/AUTO DIFF WBC: CPT

## 2023-01-02 PROCEDURE — A9270 NON-COVERED ITEM OR SERVICE: HCPCS

## 2023-01-02 PROCEDURE — 110371 HCHG SHELL REV 272: Performed by: UROLOGY

## 2023-01-02 PROCEDURE — 99222 1ST HOSP IP/OBS MODERATE 55: CPT | Mod: FS

## 2023-01-02 PROCEDURE — 700102 HCHG RX REV CODE 250 W/ 637 OVERRIDE(OP): Performed by: EMERGENCY MEDICINE

## 2023-01-02 PROCEDURE — 0TC68ZZ EXTIRPATION OF MATTER FROM RIGHT URETER, VIA NATURAL OR ARTIFICIAL OPENING ENDOSCOPIC: ICD-10-PCS | Performed by: UROLOGY

## 2023-01-02 PROCEDURE — 160039 HCHG SURGERY MINUTES - EA ADDL 1 MIN LEVEL 3: Performed by: UROLOGY

## 2023-01-02 PROCEDURE — 160035 HCHG PACU - 1ST 60 MINS PHASE I: Performed by: UROLOGY

## 2023-01-02 PROCEDURE — 00910 ANES TRANSURETHRAL PX NOS: CPT | Performed by: EMERGENCY MEDICINE

## 2023-01-02 PROCEDURE — C2617 STENT, NON-COR, TEM W/O DEL: HCPCS | Performed by: UROLOGY

## 2023-01-02 PROCEDURE — 82365 CALCULUS SPECTROSCOPY: CPT

## 2023-01-02 PROCEDURE — 88300 SURGICAL PATH GROSS: CPT

## 2023-01-02 PROCEDURE — A9270 NON-COVERED ITEM OR SERVICE: HCPCS | Performed by: EMERGENCY MEDICINE

## 2023-01-02 PROCEDURE — 700101 HCHG RX REV CODE 250: Performed by: EMERGENCY MEDICINE

## 2023-01-02 PROCEDURE — 36415 COLL VENOUS BLD VENIPUNCTURE: CPT

## 2023-01-02 PROCEDURE — 83735 ASSAY OF MAGNESIUM: CPT

## 2023-01-02 PROCEDURE — 160028 HCHG SURGERY MINUTES - 1ST 30 MINS LEVEL 3: Performed by: UROLOGY

## 2023-01-02 DEVICE — STENT UROLOGICAL POLARIS 6X26  ULTRA: Type: IMPLANTABLE DEVICE | Site: URETER | Status: FUNCTIONAL

## 2023-01-02 RX ORDER — PROMETHAZINE HYDROCHLORIDE 25 MG/1
12.5-25 TABLET ORAL EVERY 4 HOURS PRN
Status: DISCONTINUED | OUTPATIENT
Start: 2023-01-02 | End: 2023-01-02 | Stop reason: HOSPADM

## 2023-01-02 RX ORDER — HYDROMORPHONE HYDROCHLORIDE 1 MG/ML
0.5 INJECTION, SOLUTION INTRAMUSCULAR; INTRAVENOUS; SUBCUTANEOUS
Status: DISCONTINUED | OUTPATIENT
Start: 2023-01-02 | End: 2023-01-02 | Stop reason: HOSPADM

## 2023-01-02 RX ORDER — DIPHENHYDRAMINE HYDROCHLORIDE 50 MG/ML
12.5 INJECTION INTRAMUSCULAR; INTRAVENOUS
Status: DISCONTINUED | OUTPATIENT
Start: 2023-01-02 | End: 2023-01-02 | Stop reason: HOSPADM

## 2023-01-02 RX ORDER — HYDROMORPHONE HYDROCHLORIDE 1 MG/ML
0.1 INJECTION, SOLUTION INTRAMUSCULAR; INTRAVENOUS; SUBCUTANEOUS
Status: DISCONTINUED | OUTPATIENT
Start: 2023-01-02 | End: 2023-01-02 | Stop reason: HOSPADM

## 2023-01-02 RX ORDER — PROMETHAZINE HYDROCHLORIDE 25 MG/1
12.5-25 SUPPOSITORY RECTAL EVERY 4 HOURS PRN
Status: DISCONTINUED | OUTPATIENT
Start: 2023-01-02 | End: 2023-01-02 | Stop reason: HOSPADM

## 2023-01-02 RX ORDER — HALOPERIDOL 5 MG/ML
1 INJECTION INTRAMUSCULAR
Status: DISCONTINUED | OUTPATIENT
Start: 2023-01-02 | End: 2023-01-02 | Stop reason: HOSPADM

## 2023-01-02 RX ORDER — OXYCODONE HYDROCHLORIDE 5 MG/1
5 TABLET ORAL
Status: DISCONTINUED | OUTPATIENT
Start: 2023-01-02 | End: 2023-01-02 | Stop reason: HOSPADM

## 2023-01-02 RX ORDER — BISACODYL 10 MG
10 SUPPOSITORY, RECTAL RECTAL
Status: DISCONTINUED | OUTPATIENT
Start: 2023-01-02 | End: 2023-01-02 | Stop reason: HOSPADM

## 2023-01-02 RX ORDER — ONDANSETRON 4 MG/1
4 TABLET, ORALLY DISINTEGRATING ORAL EVERY 4 HOURS PRN
Status: DISCONTINUED | OUTPATIENT
Start: 2023-01-02 | End: 2023-01-02 | Stop reason: HOSPADM

## 2023-01-02 RX ORDER — MEPERIDINE HYDROCHLORIDE 25 MG/ML
6.25 INJECTION INTRAMUSCULAR; INTRAVENOUS; SUBCUTANEOUS
Status: DISCONTINUED | OUTPATIENT
Start: 2023-01-02 | End: 2023-01-02 | Stop reason: HOSPADM

## 2023-01-02 RX ORDER — SODIUM CHLORIDE, SODIUM LACTATE, POTASSIUM CHLORIDE, CALCIUM CHLORIDE 600; 310; 30; 20 MG/100ML; MG/100ML; MG/100ML; MG/100ML
INJECTION, SOLUTION INTRAVENOUS
Status: DISCONTINUED | OUTPATIENT
Start: 2023-01-02 | End: 2023-01-02 | Stop reason: SURG

## 2023-01-02 RX ORDER — DEXAMETHASONE SODIUM PHOSPHATE 4 MG/ML
INJECTION, SOLUTION INTRA-ARTICULAR; INTRALESIONAL; INTRAMUSCULAR; INTRAVENOUS; SOFT TISSUE PRN
Status: DISCONTINUED | OUTPATIENT
Start: 2023-01-02 | End: 2023-01-02 | Stop reason: SURG

## 2023-01-02 RX ORDER — ONDANSETRON 2 MG/ML
INJECTION INTRAMUSCULAR; INTRAVENOUS PRN
Status: DISCONTINUED | OUTPATIENT
Start: 2023-01-02 | End: 2023-01-02 | Stop reason: SURG

## 2023-01-02 RX ORDER — SODIUM CHLORIDE, SODIUM LACTATE, POTASSIUM CHLORIDE, AND CALCIUM CHLORIDE .6; .31; .03; .02 G/100ML; G/100ML; G/100ML; G/100ML
500 INJECTION, SOLUTION INTRAVENOUS
Status: DISCONTINUED | OUTPATIENT
Start: 2023-01-02 | End: 2023-01-02 | Stop reason: HOSPADM

## 2023-01-02 RX ORDER — OXYCODONE HYDROCHLORIDE AND ACETAMINOPHEN 5; 325 MG/1; MG/1
2 TABLET ORAL
Status: COMPLETED | OUTPATIENT
Start: 2023-01-02 | End: 2023-01-02

## 2023-01-02 RX ORDER — LIDOCAINE HYDROCHLORIDE 20 MG/ML
INJECTION, SOLUTION EPIDURAL; INFILTRATION; INTRACAUDAL; PERINEURAL PRN
Status: DISCONTINUED | OUTPATIENT
Start: 2023-01-02 | End: 2023-01-02 | Stop reason: SURG

## 2023-01-02 RX ORDER — OXYCODONE HYDROCHLORIDE AND ACETAMINOPHEN 5; 325 MG/1; MG/1
1 TABLET ORAL
Status: COMPLETED | OUTPATIENT
Start: 2023-01-02 | End: 2023-01-02

## 2023-01-02 RX ORDER — ACETAMINOPHEN 325 MG/1
650 TABLET ORAL EVERY 6 HOURS PRN
Status: DISCONTINUED | OUTPATIENT
Start: 2023-01-02 | End: 2023-01-02 | Stop reason: HOSPADM

## 2023-01-02 RX ORDER — ONDANSETRON 2 MG/ML
4 INJECTION INTRAMUSCULAR; INTRAVENOUS
Status: DISCONTINUED | OUTPATIENT
Start: 2023-01-02 | End: 2023-01-02 | Stop reason: HOSPADM

## 2023-01-02 RX ORDER — CEFAZOLIN SODIUM 1 G/3ML
INJECTION, POWDER, FOR SOLUTION INTRAMUSCULAR; INTRAVENOUS PRN
Status: DISCONTINUED | OUTPATIENT
Start: 2023-01-02 | End: 2023-01-02 | Stop reason: SURG

## 2023-01-02 RX ORDER — HYDROMORPHONE HYDROCHLORIDE 1 MG/ML
0.2 INJECTION, SOLUTION INTRAMUSCULAR; INTRAVENOUS; SUBCUTANEOUS
Status: DISCONTINUED | OUTPATIENT
Start: 2023-01-02 | End: 2023-01-02 | Stop reason: HOSPADM

## 2023-01-02 RX ORDER — POLYETHYLENE GLYCOL 3350 17 G/17G
1 POWDER, FOR SOLUTION ORAL
Status: DISCONTINUED | OUTPATIENT
Start: 2023-01-02 | End: 2023-01-02 | Stop reason: HOSPADM

## 2023-01-02 RX ORDER — ONDANSETRON 2 MG/ML
4 INJECTION INTRAMUSCULAR; INTRAVENOUS EVERY 4 HOURS PRN
Status: DISCONTINUED | OUTPATIENT
Start: 2023-01-02 | End: 2023-01-02 | Stop reason: HOSPADM

## 2023-01-02 RX ORDER — LABETALOL HYDROCHLORIDE 5 MG/ML
5 INJECTION, SOLUTION INTRAVENOUS
Status: DISCONTINUED | OUTPATIENT
Start: 2023-01-02 | End: 2023-01-02 | Stop reason: HOSPADM

## 2023-01-02 RX ORDER — OXYCODONE HYDROCHLORIDE 10 MG/1
10 TABLET ORAL
Status: DISCONTINUED | OUTPATIENT
Start: 2023-01-02 | End: 2023-01-02 | Stop reason: HOSPADM

## 2023-01-02 RX ORDER — PROCHLORPERAZINE EDISYLATE 5 MG/ML
5-10 INJECTION INTRAMUSCULAR; INTRAVENOUS EVERY 4 HOURS PRN
Status: DISCONTINUED | OUTPATIENT
Start: 2023-01-02 | End: 2023-01-02 | Stop reason: HOSPADM

## 2023-01-02 RX ORDER — MIDAZOLAM HYDROCHLORIDE 1 MG/ML
INJECTION INTRAMUSCULAR; INTRAVENOUS PRN
Status: DISCONTINUED | OUTPATIENT
Start: 2023-01-02 | End: 2023-01-02 | Stop reason: SURG

## 2023-01-02 RX ORDER — SODIUM CHLORIDE, SODIUM LACTATE, POTASSIUM CHLORIDE, CALCIUM CHLORIDE 600; 310; 30; 20 MG/100ML; MG/100ML; MG/100ML; MG/100ML
INJECTION, SOLUTION INTRAVENOUS CONTINUOUS
Status: DISCONTINUED | OUTPATIENT
Start: 2023-01-02 | End: 2023-01-02 | Stop reason: HOSPADM

## 2023-01-02 RX ORDER — AMOXICILLIN 250 MG
2 CAPSULE ORAL 2 TIMES DAILY
Status: DISCONTINUED | OUTPATIENT
Start: 2023-01-02 | End: 2023-01-02 | Stop reason: HOSPADM

## 2023-01-02 RX ORDER — HYDRALAZINE HYDROCHLORIDE 20 MG/ML
5 INJECTION INTRAMUSCULAR; INTRAVENOUS
Status: DISCONTINUED | OUTPATIENT
Start: 2023-01-02 | End: 2023-01-02 | Stop reason: HOSPADM

## 2023-01-02 RX ORDER — HYDROMORPHONE HYDROCHLORIDE 1 MG/ML
0.4 INJECTION, SOLUTION INTRAMUSCULAR; INTRAVENOUS; SUBCUTANEOUS
Status: DISCONTINUED | OUTPATIENT
Start: 2023-01-02 | End: 2023-01-02 | Stop reason: HOSPADM

## 2023-01-02 RX ADMIN — ONDANSETRON 4 MG: 2 INJECTION INTRAMUSCULAR; INTRAVENOUS at 00:37

## 2023-01-02 RX ADMIN — ONDANSETRON 4 MG: 2 INJECTION INTRAMUSCULAR; INTRAVENOUS at 14:46

## 2023-01-02 RX ADMIN — HYDROMORPHONE HYDROCHLORIDE 0.5 MG: 1 INJECTION, SOLUTION INTRAMUSCULAR; INTRAVENOUS; SUBCUTANEOUS at 00:19

## 2023-01-02 RX ADMIN — FENTANYL CITRATE 50 MCG: 50 INJECTION, SOLUTION INTRAMUSCULAR; INTRAVENOUS at 11:21

## 2023-01-02 RX ADMIN — MIDAZOLAM HYDROCHLORIDE 2 MG: 1 INJECTION, SOLUTION INTRAMUSCULAR; INTRAVENOUS at 11:17

## 2023-01-02 RX ADMIN — SODIUM CHLORIDE, POTASSIUM CHLORIDE, SODIUM LACTATE AND CALCIUM CHLORIDE: 600; 310; 30; 20 INJECTION, SOLUTION INTRAVENOUS at 11:17

## 2023-01-02 RX ADMIN — LIDOCAINE HYDROCHLORIDE 100 MG: 20 INJECTION, SOLUTION EPIDURAL; INFILTRATION; INTRACAUDAL at 11:21

## 2023-01-02 RX ADMIN — FENTANYL CITRATE 50 MCG: 50 INJECTION, SOLUTION INTRAMUSCULAR; INTRAVENOUS at 11:39

## 2023-01-02 RX ADMIN — HYDROMORPHONE HYDROCHLORIDE 0.5 MG: 1 INJECTION, SOLUTION INTRAMUSCULAR; INTRAVENOUS; SUBCUTANEOUS at 05:27

## 2023-01-02 RX ADMIN — CEFAZOLIN 2 G: 330 INJECTION, POWDER, FOR SOLUTION INTRAMUSCULAR; INTRAVENOUS at 11:31

## 2023-01-02 RX ADMIN — ONDANSETRON 4 MG: 2 INJECTION INTRAMUSCULAR; INTRAVENOUS at 11:35

## 2023-01-02 RX ADMIN — DEXAMETHASONE SODIUM PHOSPHATE 4 MG: 4 INJECTION, SOLUTION INTRA-ARTICULAR; INTRALESIONAL; INTRAMUSCULAR; INTRAVENOUS; SOFT TISSUE at 11:35

## 2023-01-02 RX ADMIN — CEFTRIAXONE SODIUM 1000 MG: 10 INJECTION, POWDER, FOR SOLUTION INTRAVENOUS at 01:19

## 2023-01-02 RX ADMIN — OXYCODONE HYDROCHLORIDE 10 MG: 10 TABLET ORAL at 09:19

## 2023-01-02 RX ADMIN — OXYCODONE AND ACETAMINOPHEN 1 TABLET: 5; 325 TABLET ORAL at 12:41

## 2023-01-02 RX ADMIN — SODIUM CHLORIDE, POTASSIUM CHLORIDE, SODIUM LACTATE AND CALCIUM CHLORIDE: 600; 310; 30; 20 INJECTION, SOLUTION INTRAVENOUS at 00:40

## 2023-01-02 RX ADMIN — ONDANSETRON 4 MG: 2 INJECTION INTRAMUSCULAR; INTRAVENOUS at 05:27

## 2023-01-02 RX ADMIN — PROPOFOL 100 MG: 10 INJECTION, EMULSION INTRAVENOUS at 11:21

## 2023-01-02 ASSESSMENT — PAIN SCALES - GENERAL: PAIN_LEVEL: 2

## 2023-01-02 ASSESSMENT — PAIN DESCRIPTION - PAIN TYPE
TYPE: SURGICAL PAIN
TYPE: ACUTE PAIN
TYPE: SURGICAL PAIN
TYPE: ACUTE PAIN

## 2023-01-02 ASSESSMENT — ENCOUNTER SYMPTOMS
CHILLS: 1
EYES NEGATIVE: 1
RESPIRATORY NEGATIVE: 1
NEUROLOGICAL NEGATIVE: 1
NAUSEA: 1
ABDOMINAL PAIN: 1
FLANK PAIN: 1
CARDIOVASCULAR NEGATIVE: 1
PSYCHIATRIC NEGATIVE: 1
VOMITING: 1

## 2023-01-02 NOTE — ANESTHESIA PROCEDURE NOTES
Airway    Date/Time: 1/2/2023 11:22 AM  Performed by: Aron James M.D.  Authorized by: Aron James M.D.     Location:  OR  Urgency:  Elective  Indications for Airway Management:  Anesthesia      Spontaneous Ventilation: absent    Sedation Level:  Deep  Preoxygenated: Yes    Mask Difficulty Assessment:  0 - not attempted  Final Airway Type:  Supraglottic airway  Final Supraglottic Airway:  Standard LMA    SGA Size:  4  Number of Attempts at Approach:  1

## 2023-01-02 NOTE — DISCHARGE SUMMARY
"Discharge Summary    CHIEF COMPLAINT ON ADMISSION  Right-sided abdominal pain    Reason for Admission  obstructive urethral stone     Admission Date  1/1/2023    CODE STATUS  Full Code    [facility]PITAL COURSE  This is \"Tiara Olmedo is a 27 y.o. female who presented 1/1/2023 with abdominal pain from obstructing ureteral stone from [facility].     Patient is a direct admit from [facility] for obstructing ureteral stone.  Patient has no significant past medical history.  Their equipment is malfunctioning and therefore she was sent for urological consult.  Dr. Rob from urology has been paged regarding the patient's arrival and will consult.     Patient states that she was in her normal state of health up until Wednesday when she developed sudden onset excruciating abdominal pain with associated nausea and vomiting.  She was seen and evaluated and diagnosed with a kidney stone and sent home with pain management.  She subsequently became worse and presented back to [facility] and was diagnosed with obstructing ureteral stone.  Patient states she has been suffering from chills and shortness of breath related to abdominal pain but denies any other acute symptomology.     I have reviewed the paperwork sent from the receiving facility.  Last labs appear to be on 12/30, no imaging reports sent, nothing available in care everywhere.  Therefore, I have ordered a stat CBC, CMP, mag, blood cultures, lactic acid and urinalysis.  Patient will be given IV fluids and kept n.p.o.  I have ordered medication for pain and nausea as well as IV Rocephin.     I have called and spoken with Dr. Rob.  Plan is to keep patient n.p.o. overnight and he will see her in the a.m.\" (from [facility]).    Patient was value by Dr. Shirley, underwent cystoscopy, with right ureteroscopy with stone extraction and right ureteral stent placement.    After procedure she " did well, no acute pain and urinating well.  Patient was discharged home safely.    Therefore, she is discharged in good and stable condition to home with close outpatient follow-up.    The patient recovered much more quickly than anticipated on admission.    Discharge Date  01/02/2023    FOLLOW UP ITEMS POST DISCHARGE  With PCP and [facility] [place]    DISCHARGE DIAGNOSES  Principal Problem (Resolved):    Ureteral stone POA: Yes  Active Problems:    Status post placement of ureteral stent POA: No  Resolved Problems:    Nausea & vomiting POA: Yes      FOLLOW UP  No future appointments.  Yung Lenz M.D.  [address]  [address]  [place] [address] [phone]    Follow up in 1 week(s)  If symptoms worsen, As needed    Eron Shirley M.D.  [address]  [place] [address]  [phone]    Call in 1 week(s)  Please follow up for ureteral stent visit.      MEDICATIONS ON DISCHARGE     Medication List      You have not been prescribed any medications.         Allergies  No Known Allergies    DIET  Orders Placed This Encounter   Procedures    Diet Order Diet: Regular     Standing Status:   Standing     Number of Occurrences:   1     Order Specific Question:   Diet:     Answer:   Regular [1]       ACTIVITY  As tolerated.  Weight bearing as tolerated    CONSULTATIONS  [facility] [place]    PROCEDURES  Cystoscopy and ureteroscopy, JJ stent placement right ureteral    LABORATORY  Lab Results   Component Value Date    SODIUM 134 (L) 01/02/2023    POTASSIUM 4.0 01/02/2023    CHLORIDE 96 01/02/2023    CO2 25 01/02/2023    GLUCOSE 102 (H) 01/02/2023    BUN 8 01/02/2023    CREATININE 0.96 01/02/2023        Lab Results   Component Value Date    WBC 14.9 (H) 01/02/2023    HEMOGLOBIN 13.6 01/02/2023    HEMATOCRIT 39.7 01/02/2023    PLATELETCT 379 01/02/2023        Total time of the discharge process exceeds 32 minutes.  More than 50% of time was spent face to face with patient.  This included but not limited to review of hospital course  with patient, treatment goals upon discharge, recommendations to PCP, continued and new medications and their adverse reactions, and nursing instructions for patient.          DX-CYSTO FLUORO > 1 HOUR    (Results Pending)

## 2023-01-02 NOTE — PROGRESS NOTES
Assumed care of patient at 0700 from [facility]. Patient is A&O x4, states pain level is 8/10, medicated patient per mar. Bed locked in lowest position with 2 rail up. Call light  in place, belongings at bedside. Hourly rounding is in place.

## 2023-01-02 NOTE — ANESTHESIA PREPROCEDURE EVALUATION
Case: [id number] Date/Time: 01/02/23 1048    Procedures:       CYSTOSCOPY, WITH URETERAL STENT INSERTION (Right)      URETEROSCOPY      LITHOTRIPSY, USING LASER    Location: [place] [address] / SURGERY [facility]    Surgeons: Eron Shirley M.D.          Relevant Problems   Other   (positive) Recurrent streptococcal tonsillitis       Physical Exam    Airway   Mallampati: III  TM distance: >3 FB  Neck ROM: full       Cardiovascular - normal exam  Rhythm: regular  Rate: normal  (-) murmur     Dental - normal exam           Pulmonary - normal exam  Breath sounds clear to auscultation     Abdominal    Neurological - normal exam                 Anesthesia Plan    ASA 2       Plan - general       Airway plan will be LMA          Induction: intravenous    Postoperative Plan: Postoperative administration of opioids is intended.    Pertinent diagnostic labs and testing reviewed    Informed Consent:    Anesthetic plan and risks discussed with patient.    Use of blood products discussed with: patient whom consented to blood products.

## 2023-01-02 NOTE — CONSULTS
DATE OF SERVICE:  01/02/2023     UROLOGY CONSULTATION     REQUESTING PHYSICIAN:  Rojelio Muñoz DO     CONSULTING PHYSICIAN:  Eron Shirley MD with Urology.     REASON FOR CONSULTATION:  Right ureteral stone and UTI.     HISTORY OF PRESENT ILLNESS:  The patient is a 27-year-old female with right   flank and right lower quadrant pain.  She was admitted to [facility] yesterday and diagnosed with a 4 mm right distal   ureteral stone.  Plan is to go to surgery for ureteroscopy and laser   lithotripsy and stent placement; however, the laser is not available at that   hospital, so she was transferred up to [facility] for treatment here.  The   patient's pain started approximately 4 days ago with nausea and vomiting.  No   fevers, chills, chest pain, shortness of breath, dysuria, gross hematuria or   bowel changes.     PAST MEDICAL HISTORY:  Significant for kidney stones.     PAST SURGICAL HISTORY:  None.     ALLERGIES:  None.     MEDICATIONS:  None on admission.     PHYSICAL EXAMINATION:  VITAL SIGNS:  Show temperature of 36.6, pulse 71, blood pressure 131/81,   respiratory rate 16, and saturations 98% on room air.  GENERAL:  The patient is alert, uncomfortable appearing.  LUNGS:  Breathing is nonlabored.  CARDIOVASCULAR:  Pulse is regular.  ABDOMEN:  Soft and tender in the right lower quadrant.  EXTREMITIES:  The patient moves all extremities normally.  NEUROLOGIC:  Grossly intact.     LABORATORY DATA:  Show white blood cell count of 14.9, this is down from 18.5   three days ago, hemoglobin 13.6, hematocrit 39%, and platelets 379.  Sodium   134, potassium 4.0, chloride 96, bicarbonate 25, BUN 8 and creatinine 0.96   with a glucose of 102.  Urinalysis, there is 20-50 red blood cells per high   power field, 2-5 whites, nitrite and leukocyte esterase negative.     DIAGNOSTIC DATA:  CT abdomen and pelvis, no contrast showed a 4 mm distal   right ureteral stone with moderate hydro.      ASSESSMENT AND PLAN:  This is a 27-year-old female with a 4 mm distal right   ureteral stone and signs of infection.  Plan is to continue broad-spectrum   antibiotics, supportive care and go to the operating room for a cystoscopy,   right ureteroscopy, laser lithotripsy and stent placement.        ______________________________  MD SACHA Xiao/DAVE    DD:  01/02/2023 12:19  DT:  01/02/2023 13:10    Job#:  [id number]

## 2023-01-02 NOTE — PROGRESS NOTES
Urology Pre-op Note:  28 yo F with intractable pain from 4mm R ureteral stone.  Transferred from [facility] due to laser not available there.    Plan:  OR for cystoscopy, right ureteroscopy with laser lithotripsy and stent.

## 2023-01-02 NOTE — OP REPORT
DATE OF SERVICE:  01/02/2023     PREOPERATIVE DIAGNOSES:  Right ureteral stone and UTI.     POSTOPERATIVE DIAGNOSES:  Right ureteral stone and UTI.     PROCEDURES PERFORMED:  Cystoscopy, right ureteroscopy with stone extraction   and right ureteral stent placement.     SURGEON:  Eron Shirley MD     ANESTHESIOLOGIST:  Aron James MD     ANESTHESIA:  General.     INDICATIONS FOR PROCEDURE:  The is a 27-year-old female with recent right   lower quadrant and right flank pain.  Imaging showing a 4 mm right distal   ureteral stone, also with UTI with early signs of sepsis.  After discussing   treatment options, she has elected for right ureteroscopy with laser   lithotripsy and possible stent placement.     DESCRIPTION OF PROCEDURE:  After obtaining informed consent, the patient was   brought to the operating room.  After the induction of general anesthesia, she   was positioned in dorsal lithotomy position and her genitalia were prepped   and draped in sterile fashion.  She was given Ancef as antibiotic prophylaxis   prior to starting the procedure.  I passed a 22-Lao cystoscope into the   bladder under direct vision.  The bladder was surveyed in its entirety, which   was normal in appearance.  I passed a sensor wire up the right ureter under   fluoroscopic guidance.  There was some difficulty due to the stone being   impacted at the UVJ, but I was able to pass the sensor wire beyond that.  I   then passed the ureteroscope up into the distal ureter and there was a stone   just at the UVJ, small enough to basket out, so used a 0 tip basket to basket   that out and sent it off for stone analysis.  There was a fair amount of   infected urine draining out.  I then passed the ureteroscope further up the   ureter up to the UPJ just to make sure there were no other stones. Due to the   infection and the edema at the distal ureter, I did elect to place a stent, so   I performed a retrograde pyelogram through the  scope, which outlined the   renal pelvis and calices and showed mild hydronephrosis.  I then passed up a   6-Colombian x 26 cm stent by backloading through the cystoscope until it was seen   to coil in the renal pelvis on fluoroscopy as well as in the bladder under   direct vision.  The string was left intact and was taped to her right inner   thigh.  The patient was then awoken from anesthesia and taken to recovery room   in stable condition.     COMPLICATIONS:  None.     ESTIMATED BLOOD LOSS:  5 mL.     SPECIMENS:  Right ureteral stone.     DRAINS:  A 6-Colombian x 26 cm right ureteral stent with string.        ______________________________  MD SACHA Xiao/DAVE    DD:  01/02/2023 12:13  DT:  01/02/2023 12:55    Job#:  [id number]

## 2023-01-02 NOTE — CARE PLAN
The patient is Stable - Low risk of patient condition declining or worsening    Shift Goals  Clinical Goals: Admission screening, pain management, NPO  Patient Goals: Pain management, eat, N/V control      Problem: Knowledge Deficit - Standard  Goal: Patient and family/care givers will demonstrate understanding of plan of care, disease process/condition, diagnostic tests and medications  Outcome: Progressing     Problem: Pain - Standard  Goal: Alleviation of pain or a reduction in pain to the patient’s comfort goal  Outcome: Progressing       Progress made toward(s) clinical / shift goals:  Plan of care reviewed with patient. All questions answered. Admission screening completed. Pain assessed. Awaiting orders. Care ongoing.

## 2023-01-02 NOTE — ANESTHESIA TIME REPORT
Anesthesia Start and Stop Event Times     Date Time Event    1/2/2023 1103 Ready for Procedure     1117 Anesthesia Start     1207 Anesthesia Stop        Responsible Staff  01/02/23    Name Role Begin End    Aron James M.D. Anesth 1117 1207        Overtime Reason:  no overtime (within assigned shift)    Comments:

## 2023-01-02 NOTE — OR SURGEON
Immediate Post OP Note    PreOp Diagnosis: R ureteral stone, UTI    PostOp Diagnosis: same    Procedure(s):  CYSTOSCOPY, WITH URETERAL STENT INSERTION  URETEROSCOPY  LITHOTRIPSY, USING LASER    Surgeon(s):  Eron Shirley M.D.    Anesthesiologist/Type of Anesthesia:  Anesthesiologist: Aron James M.D./* No anesthesia type entered *    Surgical Staff:  Circulator: Silvano Muller R.N.  Laser Staff: Robert Rodriguez  Scrub Person: Adali Palacio    Specimens removed if any:  * No specimens in log *    Estimated Blood Loss: 5ml    Findings: 4mm R UVJ stone    Complications: none    Drain: 9GgT36mt R ureteral stent with string    1/2/2023 12:05 PM Eron Shirley M.D.

## 2023-01-02 NOTE — PROGRESS NOTES
4 Eyes Skin Assessment Completed by JUS Grimm and JUS Robbins.    Head WDL  Ears WDL  Nose WDL  Mouth WDL  Neck WDL  Breast/Chest WDL  Shoulder Blades WDL  Spine WDL  (R) Arm/Elbow/Hand WDL  (L) Arm/Elbow/Hand WDL  Abdomen WDL  Groin WDL  Scrotum/Coccyx/Buttocks WDL  (R) Leg WDL  (L) Leg WDL  (R) Heel/Foot/Toe WDL  (L) Heel/Foot/Toe WDL          Devices In Places Blood pressure cuff      Interventions In Place Pillows and Pressure Redistribution Mattress    Possible Skin Injury No    Pictures Uploaded Into Epic N/A  Wound Consult Placed N/A  RN Wound Prevention Protocol Ordered No

## 2023-01-02 NOTE — OR NURSING
1205- Pt arrives to PACU from OR on 6L of oxygen via mask and OPA in place. Report received. No dressings, strings taped to leg.     1230- OPA removed, pt denies pain and nausea.    1242- Pt SO Loc called and updated on pt status and POC. Pt tolerating sips of water.    1245- Report called to [facility].

## 2023-01-02 NOTE — H&P
Hospital Medicine History & Physical Note    Date of Service  1/2/2023    Primary Care Physician  Yung Lenz M.D.    Consultants  urology    Specialist Names: Dr. Rob    Code Status  Full Code    Chief Complaint  No chief complaint on file.      History of Presenting Illness  Tiara Olmedo is a 27 y.o. female who presented 1/1/2023 with abdominal pain from obstructing ureteral stone from [facility].    Patient is a direct admit from [facility] for obstructing ureteral stone.  Patient has no significant past medical history.  Their equipment is malfunctioning and therefore she was sent for urological consult.  Dr. Rob from urology has been paged regarding the patient's arrival and will consult.    Patient states that she was in her normal state of health up until Wednesday when she developed sudden onset excruciating abdominal pain with associated nausea and vomiting.  She was seen and evaluated and diagnosed with a kidney stone and sent home with pain management.  She subsequently became worse and presented back to [facility] and was diagnosed with obstructing ureteral stone.  Patient states she has been suffering from chills and shortness of breath related to abdominal pain but denies any other acute symptomology.    I have reviewed the paperwork sent from the receiving facility.  Last labs appear to be on 12/30, no imaging reports sent, nothing available in care everywhere.  Therefore, I have ordered a stat CBC, CMP, mag, blood cultures, lactic acid and urinalysis.  Patient will be given IV fluids and kept n.p.o.  I have ordered medication for pain and nausea as well as IV Rocephin.    I have called and spoken with Dr. Rob.  Plan is to keep patient n.p.o. overnight and he will see her in the a.m.    I discussed the plan of care with patient, bedside RN, and collaborating physician .    Review of  Systems  Review of Systems   Constitutional:  Positive for chills and malaise/fatigue.   HENT: Negative.     Eyes: Negative.    Respiratory: Negative.     Cardiovascular: Negative.    Gastrointestinal:  Positive for abdominal pain, nausea and vomiting.   Genitourinary:  Positive for flank pain.   Skin: Negative.    Neurological: Negative.    Endo/Heme/Allergies: Negative.    Psychiatric/Behavioral: Negative.       Past Medical History   has a past medical history of Nausea & vomiting (1/2/2023).    Surgical History   has no past surgical history on file.     Family History  family history includes Diabetes in her father.   Family history reviewed with patient. There is no family history that is pertinent to the chief complaint.     Social History   reports that she has never smoked. She has never used smokeless tobacco. She reports current alcohol use. She reports that she does not use drugs.    Allergies  No Known Allergies    Medications  None       Physical Exam  Temp:  [36.6 °C (97.9 °F)] 36.6 °C (97.9 °F)  Pulse:  [71] 71  Resp:  [16] 16  BP: (131)/(81) 131/81  SpO2:  [98 %] 98 %  Blood Pressure: 131/81   Temperature: 36.6 °C (97.9 °F)   Pulse: 71   Respiration: 16   Pulse Oximetry: 98 %       Physical Exam  Vitals and nursing note reviewed.   Constitutional:       Appearance: Normal appearance.   HENT:      Head: Normocephalic.      Nose: Nose normal.      Mouth/Throat:      Mouth: Mucous membranes are dry.   Eyes:      Extraocular Movements: Extraocular movements intact.      Pupils: Pupils are equal, round, and reactive to light.   Cardiovascular:      Rate and Rhythm: Normal rate and regular rhythm.      Pulses: Normal pulses.      Heart sounds: Normal heart sounds.   Pulmonary:      Effort: Pulmonary effort is normal.      Breath sounds: Normal breath sounds.   Abdominal:      Palpations: Abdomen is soft.      Tenderness: There is abdominal tenderness.   Musculoskeletal:         General: Normal range of  motion.      Cervical back: Normal range of motion.   Skin:     General: Skin is warm.      Capillary Refill: Capillary refill takes less than 2 seconds.   Neurological:      General: No focal deficit present.      Mental Status: She is alert and oriented to person, place, and time. Mental status is at baseline.   Psychiatric:         Mood and Affect: Mood normal.         Behavior: Behavior normal.         Thought Content: Thought content normal.         Judgment: Judgment normal.       Laboratory:          No results for input(s): ALTSGPT, ASTSGOT, ALKPHOSPHAT, TBILIRUBIN, DBILIRUBIN, GAMMAGT, AMYLASE, LIPASE, ALB, PREALBUMIN, GLUCOSE in the last 72 hours.      No results for input(s): NTPROBNP in the last 72 hours.      No results for input(s): TROPONINT in the last 72 hours.    Imaging:  No orders to display       no X-Ray or EKG requiring interpretation    Assessment/Plan:  Justification for Admission Status  I anticipate this patient will require at least two midnights for appropriate medical management, necessitating inpatient admission because IV antibiotics, IV fluids, pain control and urology consult    Patient will need a Med/Surg bed on MEDICAL service .  The need is secondary to urology consult.    * Ureteral stone- (present on admission)  Assessment & Plan  Sent from [facility] for obstructing ureteral stone  Dr. Rob from urology consulted  IV fluids  IV Rocephin  Treat pain and nausea    Nausea & vomiting- (present on admission)  Assessment & Plan  Treat  N.p.o. for possible procedure, advance diet as tolerated after  IV fluids        VTE prophylaxis: SCDs/TEDs

## 2023-01-02 NOTE — ASSESSMENT & PLAN NOTE
Sent from [facility] for obstructing ureteral stone  Dr. Rob from urology consulted  IV fluids  IV Rocephin  Treat pain and nausea

## 2023-01-02 NOTE — ANESTHESIA POSTPROCEDURE EVALUATION
Patient: Tiara Olmedo    Procedure Summary     Date: 01/02/23 Room / Location: [place] [id number] / SURGERY [facility]    Anesthesia Start: 1117 Anesthesia Stop: 1207    Procedures:       CYSTOSCOPY, WITH URETERAL STENT INSERTION (Right)      URETEROSCOPY      LITHOTRIPSY, USING LASER Diagnosis: (Right Ureteral stone)    Surgeons: Eron Shirley M.D. Responsible Provider: Aron James M.D.    Anesthesia Type: general ASA Status: 2          Final Anesthesia Type: general  Last vitals  BP   Blood Pressure: 128/82    Temp   36.5 °C (97.7 °F)    Pulse   67   Resp   16    SpO2   98 %      Anesthesia Post Evaluation    Patient location during evaluation: PACU  Patient participation: complete - patient participated  Level of consciousness: awake and alert  Pain score: 2    Airway patency: patent  Anesthetic complications: no  Cardiovascular status: hemodynamically stable  Respiratory status: acceptable  Hydration status: euvolemic    PONV: none          No notable events documented.     Nurse Pain Score: 9 (NPRS)

## 2023-01-02 NOTE — PROGRESS NOTES
Received report from [place] RN and assumed care of patient at 23:20. Patient ambulated from [facility] to hospital bed with SBA. A&Ox4, VSS on RA, and complaining of 8/10 ABD pain. Strict NPO per orders. Awaiting med orders. Fall precautions in place. Bed in the lowest position, wheels locked, and call light within reach. Consent to treat signed by patient and in chart. Care ongoing.

## 2023-01-04 LAB
APPEARANCE STONE: NORMAL
COMPN STONE: NORMAL
SPECIMEN WT: 17 MG

## 2023-01-07 LAB
BACTERIA BLD CULT: NORMAL
BACTERIA BLD CULT: NORMAL
SIGNIFICANT IND 70042: NORMAL
SIGNIFICANT IND 70042: NORMAL
SITE SITE: NORMAL
SITE SITE: NORMAL
SOURCE SOURCE: NORMAL
SOURCE SOURCE: NORMAL

## (undated) DEVICE — SPONGE GAUZESTER 4 X 4 4PLY - (128PK/CA)

## (undated) DEVICE — BASKET ZERO 1.9FR X 120CM

## (undated) DEVICE — LACTATED RINGERS INJ 1000 ML - (14EA/CA 60CA/PF)

## (undated) DEVICE — SUCTION INSTRUMENT YANKAUER BULBOUS TIP W/O VENT (50EA/CA)

## (undated) DEVICE — CANISTER SUCTION 3000ML MECHANICAL FILTER AUTO SHUTOFF MEDI-VAC NONSTERILE LF DISP  (40EA/CA)

## (undated) DEVICE — WIRE GUIDE SENSOR DUAL FLEX - 5/BX

## (undated) DEVICE — SLEEVE, VASO, THIGH, MED

## (undated) DEVICE — CONNECTOR HOSE NEPTUNE FOR CYSTO ROOM

## (undated) DEVICE — SET EXTENSION WITH 2 PORTS (48EA/CA) ***PART #2C8610 IS A SUBSTITUTE*****

## (undated) DEVICE — TUBING CLEARLINK DUO-VENT - C-FLO (48EA/CA)

## (undated) DEVICE — SENSOR OXIMETER ADULT SPO2 RD SET (20EA/BX)

## (undated) DEVICE — WATER IRRIG. STER 3000 ML - (4/CA)

## (undated) DEVICE — SET LEADWIRE 5 LEAD BEDSIDE DISPOSABLE ECG (1SET OF 5/EA)

## (undated) DEVICE — CONTAINER SPECIMEN BAG OR - STERILE 4 OZ W/LID (100EA/CA)

## (undated) DEVICE — GOWN WARMING STANDARD FLEX - (30/CA)

## (undated) DEVICE — WATER IRRIGATION STERILE 1000ML (12EA/CA)

## (undated) DEVICE — PACK CYSTO III (2EA/CA)

## (undated) DEVICE — COVER LIGHT HANDLE ALC PLUS DISP (18EA/BX)

## (undated) DEVICE — GLOVE BIOGEL SZ 7.5 SURGICAL PF LTX - (50PR/BX 4BX/CA)

## (undated) DEVICE — SODIUM CHL. IRRIGATION 0.9% 3000ML (4EA/CA 65CA/PF)

## (undated) DEVICE — BAG URODRAIN WITH TUBING - (20/CA)

## (undated) DEVICE — COVER FOOT UNIVERSAL DISP. - (25EA/CA)